# Patient Record
Sex: FEMALE | Race: ASIAN | NOT HISPANIC OR LATINO | Employment: FULL TIME | URBAN - METROPOLITAN AREA
[De-identification: names, ages, dates, MRNs, and addresses within clinical notes are randomized per-mention and may not be internally consistent; named-entity substitution may affect disease eponyms.]

---

## 2022-03-17 ENCOUNTER — APPOINTMENT (EMERGENCY)
Dept: RADIOLOGY | Facility: HOSPITAL | Age: 27
End: 2022-03-17
Payer: OTHER MISCELLANEOUS

## 2022-03-17 ENCOUNTER — HOSPITAL ENCOUNTER (EMERGENCY)
Facility: HOSPITAL | Age: 27
Discharge: HOME | End: 2022-03-17
Attending: EMERGENCY MEDICINE | Admitting: EMERGENCY MEDICINE
Payer: OTHER MISCELLANEOUS

## 2022-03-17 VITALS
DIASTOLIC BLOOD PRESSURE: 86 MMHG | HEART RATE: 96 BPM | OXYGEN SATURATION: 97 % | RESPIRATION RATE: 16 BRPM | TEMPERATURE: 98.9 F | SYSTOLIC BLOOD PRESSURE: 134 MMHG

## 2022-03-17 DIAGNOSIS — S61.419A HAND LACERATION: Primary | ICD-10-CM

## 2022-03-17 PROCEDURE — 99282 EMERGENCY DEPT VISIT SF MDM: CPT | Performed by: EMERGENCY MEDICINE

## 2022-03-17 PROCEDURE — 12001 RPR S/N/AX/GEN/TRNK 2.5CM/<: CPT | Performed by: EMERGENCY MEDICINE

## 2022-03-17 PROCEDURE — 73130 X-RAY EXAM OF HAND: CPT

## 2022-03-17 PROCEDURE — 99283 EMERGENCY DEPT VISIT LOW MDM: CPT

## 2022-03-17 PROCEDURE — 90715 TDAP VACCINE 7 YRS/> IM: CPT | Performed by: EMERGENCY MEDICINE

## 2022-03-17 PROCEDURE — 90471 IMMUNIZATION ADMIN: CPT

## 2022-03-17 RX ORDER — GINSENG 100 MG
1 CAPSULE ORAL ONCE
Status: DISCONTINUED | OUTPATIENT
Start: 2022-03-17 | End: 2022-03-17 | Stop reason: HOSPADM

## 2022-03-17 RX ORDER — LIDOCAINE HYDROCHLORIDE AND EPINEPHRINE 10; 10 MG/ML; UG/ML
20 INJECTION, SOLUTION INFILTRATION; PERINEURAL ONCE
Status: COMPLETED | OUTPATIENT
Start: 2022-03-17 | End: 2022-03-17

## 2022-03-17 RX ADMIN — TETANUS TOXOID, REDUCED DIPHTHERIA TOXOID AND ACELLULAR PERTUSSIS VACCINE, ADSORBED 0.5 ML: 5; 2.5; 8; 8; 2.5 SUSPENSION INTRAMUSCULAR at 16:45

## 2022-03-17 RX ADMIN — LIDOCAINE HYDROCHLORIDE,EPINEPHRINE BITARTRATE 20 ML: 10; .01 INJECTION, SOLUTION INFILTRATION; PERINEURAL at 16:47

## 2022-03-17 NOTE — ED PROVIDER NOTES
History  Chief Complaint   Patient presents with    Hand Injury     lac to left thumb and right pinky (bottom) Pt is a  and she was screwing a top back onto a glass container when it broke and it sliced her finger  Bleeding controlled with pressure but continues when pressure is removed  HPI  30-year-old female presents with laceration to left thumb and right hand  Patient was trying to manipulate a glass container when it broke and cut her hands  No weakness or numbness  Tetanus not up-to-date  None       History reviewed  No pertinent past medical history  History reviewed  No pertinent surgical history  History reviewed  No pertinent family history  I have reviewed and agree with the history as documented  E-Cigarette/Vaping     E-Cigarette/Vaping Substances     Social History     Tobacco Use    Smoking status: Not on file    Smokeless tobacco: Not on file   Substance Use Topics    Alcohol use: Not on file    Drug use: Not on file       Review of Systems   Constitutional: Negative for chills and fever  HENT: Negative for dental problem and ear pain  Eyes: Negative for pain and redness  Respiratory: Negative for cough and shortness of breath  Cardiovascular: Negative for chest pain and palpitations  Gastrointestinal: Negative for abdominal pain and nausea  Endocrine: Negative for polydipsia and polyphagia  Genitourinary: Negative for dysuria and frequency  Musculoskeletal: Negative for arthralgias and joint swelling  Skin: Positive for wound  Negative for color change and rash  Neurological: Negative for dizziness and headaches  Psychiatric/Behavioral: Negative for behavioral problems and confusion  All other systems reviewed and are negative  Physical Exam  Physical Exam  Vitals and nursing note reviewed  Constitutional:       General: She is not in acute distress  HENT:      Head: Normocephalic and atraumatic        Right Ear: External ear normal  Left Ear: External ear normal       Nose: Nose normal    Eyes:      General: No scleral icterus  Cardiovascular:      Rate and Rhythm: Normal rate  Pulmonary:      Effort: Pulmonary effort is normal  No respiratory distress  Abdominal:      General: There is no distension  Musculoskeletal:         General: No deformity  Normal range of motion  Comments: L thumb with 1 cm laceration to dorsal aspect, normal ROM, radial pulse 2+  R hand with   5 cm laceration to base of fifth digit   Skin:     Findings: No rash  Neurological:      General: No focal deficit present  Mental Status: She is alert        Gait: Gait normal    Psychiatric:         Mood and Affect: Mood normal          Vital Signs  ED Triage Vitals [03/17/22 1638]   Temperature Pulse Respirations Blood Pressure SpO2   98 9 °F (37 2 °C) 96 16 134/86 97 %      Temp Source Heart Rate Source Patient Position - Orthostatic VS BP Location FiO2 (%)   Oral Monitor Sitting Right arm --      Pain Score       --           Vitals:    03/17/22 1638   BP: 134/86   Pulse: 96   Patient Position - Orthostatic VS: Sitting         Visual Acuity      ED Medications  Medications   bacitracin topical ointment 1 small application (has no administration in time range)   tetanus-diphtheria-acellular pertussis (BOOSTRIX) IM injection 0 5 mL (0 5 mL Intramuscular Given 3/17/22 1645)   lidocaine-epinephrine (XYLOCAINE/EPINEPHRINE) 1 %-1:100,000 injection 20 mL (20 mL Infiltration Given 3/17/22 1647)       Diagnostic Studies  Results Reviewed     None                 XR hand 3+ views LEFT    (Results Pending)   XR hand 3+ views RIGHT    (Results Pending)              Procedures  Laceration repair    Date/Time: 3/17/2022 5:45 PM  Performed by: Alfonzo Ledesma MD  Authorized by: Alfonzo Ledesma MD   Consent given by: patient  Patient identity confirmed: verbally with patient and arm band  Body area: upper extremity  Location details: left thumb  Laceration length: 1 cm  Foreign bodies: no foreign bodies  Anesthesia: local infiltration    Anesthesia:  Local Anesthetic: lidocaine 1% with epinephrine      Procedure Details:  Irrigation solution: saline  Amount of cleaning: standard  Skin closure: 4-0 nylon  Number of sutures: 3  Technique: simple  Approximation: close  Approximation difficulty: simple  Dressing: antibiotic ointment and gauze roll               ED Course                               SBIRT 22yo+      Most Recent Value   SBIRT (24 yo +)    In order to provide better care to our patients, we are screening all of our patients for alcohol and drug use  Would it be okay to ask you these screening questions? Yes Filed at: 03/17/2022 1639   Initial Alcohol Screen: US AUDIT-C     1  How often do you have a drink containing alcohol? 0 Filed at: 03/17/2022 1639   2  How many drinks containing alcohol do you have on a typical day you are drinking? 0 Filed at: 03/17/2022 1639   3a  Male UNDER 65: How often do you have five or more drinks on one occasion? 0 Filed at: 03/17/2022 1639   3b  FEMALE Any Age, or MALE 65+: How often do you have 4 or more drinks on one occassion? 0 Filed at: 03/17/2022 1639   Audit-C Score 0 Filed at: 03/17/2022 1639   ALAN: How many times in the past year have you    Used an illegal drug or used a prescription medication for non-medical reasons? Never Filed at: 03/17/2022 1639                    [facility]  Thumb laceration repaired, no FB seen  Abrasion to R hand has been glued prior to arrival, no bleeding  Disposition  Final diagnoses:   Hand laceration     Time reflects when diagnosis was documented in both MDM as applicable and the Disposition within this note     Time User Action Codes Description Comment    3/17/2022  5:34 PM Aileen Jacobson Add [D60 [id number]] Hand laceration       ED Disposition     ED Disposition Condition Date/Time Comment    Discharge Stable Thu Mar 17, 2022  5:35 PM Rico Leak discharge to home/self care              Follow-up Information     Follow up With Specialties Details Why Contact Info Additional Information    [address] [facility] [address] Emergency Department Emergency Medicine In 14 days For suture removal [address] Emergency Department, [address] [place] [address], [facility]          Patient's Medications    No medications on file       No discharge procedures on file      PDMP Review     None          ED Provider  Electronically Signed by           Christal Felder MD  03/17/22 3853

## 2022-03-17 NOTE — ED NOTES
Pt is discharged to home at this time  VSS  AVS given and pt expressed understanding         Lai Heller, RAFY  03/17/22 5769

## 2022-04-12 ENCOUNTER — TELEPHONE (OUTPATIENT)
Dept: OBGYN CLINIC | Facility: OTHER | Age: 27
End: 2022-04-12

## 2022-04-12 NOTE — TELEPHONE ENCOUNTER
Mary Khan sent this email to [facility]!!    I have a patient that had a Hand Injury at work and is requesting to come in and see Dr Aubrie Reyes      Patient is :  Lisset Mcnally   : [birth year]-  MRN : [id number]  C/b # [phone]  [employer]   Reason for Appointment : Tendon Injury to Left Thumb  Requested Doctor & Location : Dr Aubrie Reyes @ [facility]     Thank you    Adrian Deleon

## 2022-05-04 ENCOUNTER — OFFICE VISIT (OUTPATIENT)
Dept: OBGYN CLINIC | Facility: CLINIC | Age: 27
End: 2022-05-04
Payer: OTHER MISCELLANEOUS

## 2022-05-04 VITALS
RESPIRATION RATE: 16 BRPM | WEIGHT: 214 LBS | HEIGHT: 65 IN | SYSTOLIC BLOOD PRESSURE: 119 MMHG | DIASTOLIC BLOOD PRESSURE: 76 MMHG | HEART RATE: 73 BPM | BODY MASS INDEX: 35.65 KG/M2

## 2022-05-04 DIAGNOSIS — S61.012A THUMB LACERATION, LEFT, INITIAL ENCOUNTER: Primary | ICD-10-CM

## 2022-05-04 PROCEDURE — 99204 OFFICE O/P NEW MOD 45 MIN: CPT | Performed by: SURGERY

## 2022-05-04 NOTE — PROGRESS NOTES
Seda HA  Attending, Orthopaedic Surgery  Hand, Wrist, and Elbow Surgery  [facility]      ORTHOPAEDIC HAND, WRIST, AND ELBOW OFFICE  VISIT       ASSESSMENT/PLAN:    26 y/o female with left thumb laceration, likely a partial high grade laceration of the EPL tendon  Patient I discussed with her injury 2 months ago she likely sustained at least a partial laceration her EPL tendon  We did discuss that this could be a full tendon laceration as well that has now formed scar tissue  Number to better evaluate the tendon I have ordered a stat ultrasound  I will also have them assess the integrity of the EIP as this may be needed for an EIP to EPL transfer should she decide to proceed with surgery  We did discuss that surgery would be indicated in order to get her better function thumb  Patient was encouraged to continue to move her thumb  I will see her back after stat ultrasound further possible surgical intervention  The patient verbalized understanding of exam findings and treatment plan  We engaged in the shared decision-making process and treatment options were discussed at length with the patient  Surgical and conservative management discussed today along with risks and benefits  Diagnoses and all orders for this visit:    Thumb laceration, left, initial encounter  -     US MSK limited; Future        Follow Up:  Return for after ultrasound  To Do Next Visit:    review ultrasound, surgical discussion        ____________________________________________________________________________________________________________________________________________      CHIEF COMPLAINT:  Chief Complaint   Patient presents with    Left Thumb - Pain, Numbness       SUBJECTIVE:  Rick Morgan is a 27y o  year old RHD female who presents to the office today for evaluation of her left thumb  This is a worker's compensation case, patient works as a     Patient states in March she was holding a nail bottle which broke  Patient sustained a laceration to the dorsal aspect of her left thumb  She was seen in the ED where sutures were placed  She did have these removed subsequently in the ED as well  Patient states since time laceration she has had trouble fully extending her thumb in comparison to contralateral side  She notes lack of control of her thumb as well  She denies any numbness or tingling  She has not had any treatment for this  Prior treatment   · NSAIDsNo   · Injections No   · Bracing/Orthotics No    Physical Therapy No     I have personally reviewed all the relevant PMH, PSH, SH, FH, Medications and allergies      PAST MEDICAL HISTORY:  Past Medical History:   Diagnosis Date    Known health problems: none        PAST SURGICAL HISTORY:  Past Surgical History:   Procedure Laterality Date    NO PAST SURGERIES         FAMILY HISTORY:  Family History   Problem Relation Age of Onset    No Known Problems Mother     No Known Problems Father        SOCIAL HISTORY:  Social History     Tobacco Use    Smoking status: Current Every Day Smoker     Packs/day: 0 25     Types: Cigarettes    Smokeless tobacco: Never Used    Tobacco comment: 1-2 cig a day   Vaping Use    Vaping Use: Never used   Substance Use Topics    Alcohol use: Not Currently    Drug use: Never       MEDICATIONS:  No current outpatient medications on file  ALLERGIES:  No Known Allergies        REVIEW OF SYSTEMS:  Review of Systems   Constitutional: Negative for chills, fever and unexpected weight change  HENT: Negative for hearing loss, nosebleeds and sore throat  Eyes: Negative for pain, redness and visual disturbance  Respiratory: Negative for cough, shortness of breath and wheezing  Cardiovascular: Negative for chest pain, palpitations and leg swelling  Gastrointestinal: Negative for abdominal pain, nausea and vomiting  Endocrine: Negative for polydipsia and polyuria     Genitourinary: Negative for dyspareunia and hematuria  Musculoskeletal: Negative for arthralgias, joint swelling and myalgias  Skin: Negative for rash and wound  Neurological: Negative for dizziness, numbness and headaches  Psychiatric/Behavioral: Negative for decreased concentration and suicidal ideas  The patient is not nervous/anxious  VITALS:  Vitals:    05/04/22 0944   BP: 119/76   Pulse: 73   Resp: 16       LABS:  HgA1c: No results found for: HGBA1C  BMP: No results found for: GLUCOSE, CALCIUM, NA, K, CO2, CL, BUN, CREATININE    _____________________________________________________  PHYSICAL EXAMINATION:  General: well developed and well nourished, alert, oriented times 3 and appears comfortable  Psychiatric: Normal  HEENT: Normocephalic, Atraumatic Trachea Midline, No torticollis  Pulmonary: No audible wheezing or respiratory distress   Abdomen/GI: Non tender, non distended   Cardiovascular: No pitting edema, 2+ radial pulse   Skin: No masses, erythema, fluctation, ulcerations  Neurovascular: Sensation Intact to the Median, Ulnar, Radial Nerve, Motor Intact to the Median, Ulnar, Radial Nerve and Pulses Intact  Musculoskeletal: Normal, except as noted in detailed exam and in HPI        MUSCULOSKELETAL EXAMINATION:  Left thumb: Laceration well-healed to the dorsal aspect of the thumb in line with the MCP joint, no erythema or ecchymosis noted Feliz Anger no swelling noted, unable to fully retropulsed thumb in comparison to contralateral side, FPL, sensation intact to the radial and ulnar aspect of the digit, brisk capillary refill noted    ___________________________________________________  STUDIES REVIEWED:  No new imaging today    PROCEDURES PERFORMED:  Procedures  No Procedures performed today    _____________________________________________________      Jimbo Finders    I,:  Aj Janus am acting as a scribe while in the presence of the attending physician :       I,:  Ashly [place] MD personally performed the services described in this documentation    as scribed in my presence :

## 2022-05-09 ENCOUNTER — TELEPHONE (OUTPATIENT)
Dept: OBGYN CLINIC | Facility: CLINIC | Age: 27
End: 2022-05-09

## 2022-05-12 ENCOUNTER — OFFICE VISIT (OUTPATIENT)
Dept: OBGYN CLINIC | Facility: CLINIC | Age: 27
End: 2022-05-12
Payer: OTHER MISCELLANEOUS

## 2022-05-12 VITALS
HEIGHT: 65 IN | DIASTOLIC BLOOD PRESSURE: 74 MMHG | RESPIRATION RATE: 16 BRPM | HEART RATE: 72 BPM | SYSTOLIC BLOOD PRESSURE: 122 MMHG | BODY MASS INDEX: 35.65 KG/M2 | WEIGHT: 214 LBS

## 2022-05-12 DIAGNOSIS — S61.012D THUMB LACERATION, LEFT, SUBSEQUENT ENCOUNTER: Primary | ICD-10-CM

## 2022-05-12 PROCEDURE — 99215 OFFICE O/P EST HI 40 MIN: CPT | Performed by: SURGERY

## 2022-05-12 RX ORDER — CHLORHEXIDINE GLUCONATE 0.12 MG/ML
15 RINSE ORAL ONCE
Status: CANCELLED | OUTPATIENT
Start: 2022-05-12 | End: 2022-05-12

## 2022-05-12 NOTE — H&P (VIEW-ONLY)
ASSESSMENT/PLAN:      27 y o  female with left thumb laceration, likely a partial high grade laceration of the EPL tendon  Ultrasound report was reviewed in the office today  It was discussed with Ailyn Cade that the ultrasound was vague and not significantly beneficial  She likely sustained a partial high grade laceration of her EPL tendon, which has likely scared in  Left thumb EPL exploration/repair vs EIp to EPL transfer was discussed at length including risks and benefits  Risks of surgery consist of but not limited to bleeding, infection, stiffness, pain, injury to surrounding structures, failure/ruture of repair, need for further surgery, etc  Ailyn Cade elected to proceed and informed surgical consent was signed  Post operative instructions/expectations were reviewed  She will likely be immobilization for aprox  6 weeks depending on what is found in the OR and which procedure is performed  She will start OT post operatively  Follow up in the office 10-14 days after surgery for suture removal      The patient verbalized understanding of exam findings and treatment plan  We engaged in the shared decision-making process and treatment options were discussed at length with the patient  Surgical and conservative management discussed today along with risks and benefits  Diagnoses and all orders for this visit:    Thumb laceration, left, subsequent encounter      Extensor Tendon Repair: The patient has elected to undergo operative repair of a lacerated extensor tendon  During surgery, an incision will be made on the dorsal aspect of the hand or wrist which will be extended proximally to the level of tendon retraction  The tendon will be delivered distally, and sutured to the remaining portion of the tendon  Postoperatively, the splint will be applied in a protected position  Postoperative physical therapy is a necessity to help improve outcomes    The risks of the surgery include tendon rupture, stiffness, pain, and incomplete motion  The risks and benefits of the procedure were explained to the patient, which include, but are not limited to: Bleeding, infection, recurrence, pain, scar, damage to tendons, damage to nerves, and damage to blood vessels, failure to give desired results and complications related to anesthesia  These risks, along with alternative conservative treatment options, and postoperative protocols were voiced back and understood by the patient  All questions were answered to the patient's satisfaction  The patient agrees to comply with a standard postoperative protocol, and is willing to proceed  Education was provided via written and auditory forms  There were no barriers to learning  Written handouts regarding wound care, incision and scar care, and general preoperative information was provided to the patient  Prior to surgery, the patient may be requested to stop all anti-inflammatory medications  Prophylactic aspirin, Plavix, and Coumadin may be allowed to be continued  Medications including vitamin E , ginkgo, and fish oil are requested to be stopped approximately one week prior to surgery  Hypertensive medications and beta blockers, if taken, should be continued  and Standard Consent: The risks and benefits of the procedure were explained to the patient, which include, but are not limited to: Bleeding, infection, recurrence, pain, scar, damage to tendons, damage to nerves, and damage to blood vessels, failure to give desired results and complications related to anesthesia  These risks, along with alternative conservative treatment options, and postoperative protocols were voiced back and understood by the patient  All questions were answered to the patient's satisfaction  The patient agrees to comply with a standard postoperative protocol, and is willing to proceed  Education was provided via written and auditory forms  There were no barriers to learning   Written handouts regarding wound care, incision and scar care, and general preoperative information was provided to the patient  Prior to surgery, the patient may be requested to stop all anti-inflammatory medications  Prophylactic aspirin, Plavix, and Coumadin may be allowed to be continued  Medications including vitamin E , ginkgo, and fish oil are requested to be stopped approximately one week prior to surgery  Hypertensive medications and beta blockers, if taken, should be continued  Follow Up:  No follow-ups on file  To Do Next Visit:  Re-evaluation of current issue and Sutures out    ____________________________________________________________________________________________________________________________________________      CHIEF COMPLAINT:  Chief Complaint   Patient presents with    Left Thumb - Pain, Follow-up       SUBJECTIVE:  Lynn Ryan is a 27y o  year old RHD female who presents to the office today for a follow up regarding a left thumb laceration  This is a worker's compensation case, patient works as a   Patient states in March she was holding a nail bottle which broke  Patient sustained a laceration to the dorsal aspect of her left thumb  She is here today to review ultrasound results  Ultrasound was performed at [address] [place] [address] imaging [place] in [place]  She notes extension at her IP joint is still limited  She notes tightness with finger flexion  I have personally reviewed all the relevant PMH, PSH, SH, FH, Medications and allergies       PAST MEDICAL HISTORY:  Past Medical History:   Diagnosis Date    Known health problems: none        PAST SURGICAL HISTORY:  Past Surgical History:   Procedure Laterality Date    NO PAST SURGERIES         FAMILY HISTORY:  Family History   Problem Relation Age of Onset    No Known Problems Mother     No Known Problems Father        SOCIAL HISTORY:  Social History     Tobacco Use    Smoking status: Current Every Day Smoker Packs/day: 0 25     Types: Cigarettes    Smokeless tobacco: Never Used    Tobacco comment: 1-2 cig a day   Vaping Use    Vaping Use: Never used   Substance Use Topics    Alcohol use: Not Currently    Drug use: Never       MEDICATIONS:  No current outpatient medications on file  ALLERGIES:  No Known Allergies    REVIEW OF SYSTEMS:  Review of Systems   Constitutional: Negative for chills, fever and unexpected weight change  HENT: Negative for hearing loss, nosebleeds and sore throat  Eyes: Negative for pain, redness and visual disturbance  Respiratory: Negative for cough, shortness of breath and wheezing  Cardiovascular: Negative for chest pain, palpitations and leg swelling  Gastrointestinal: Negative for abdominal pain, nausea and vomiting  Endocrine: Negative for polydipsia and polyuria  Genitourinary: Negative for difficulty urinating and hematuria  Musculoskeletal: Negative for arthralgias, joint swelling and myalgias  Skin: Negative for rash and wound  Neurological: Negative for dizziness, numbness and headaches  Psychiatric/Behavioral: Negative for decreased concentration, dysphoric mood and suicidal ideas  The patient is not nervous/anxious          VITALS:  Vitals:    05/12/22 1018   BP: 122/74   Pulse: 72   Resp: 16       LABS:  HgA1c: No results found for: HGBA1C  BMP: No results found for: GLUCOSE, CALCIUM, NA, K, CO2, CL, BUN, CREATININE    _____________________________________________________  PHYSICAL EXAMINATION:  General: well developed and well nourished, alert, oriented times 3 and appears comfortable  Psychiatric: Normal  HEENT: Normocephalic, Atraumatic Trachea Midline, No torticollis  Pulmonary: No audible wheezing or respiratory distress   Cardiovascular: No pitting edema, 2+ radial pulse   Abdominal/GI: abdomen non tender, non distended   Skin: No Masses, No Erythema, No Fluctuation, No Ulcerations  Neurovascular: Sensation Intact to the Median, Ulnar, Radial Nerve, Motor Intact to the Median, Ulnar, Radial Nerve and Pulses Intact  Musculoskeletal: Normal, except as noted in detailed exam and in HPI        MUSCULOSKELETAL EXAMINATION:    Left thumb:     No erythema, ecchymosis or edema  Well healed laceration to the dorsal aspect in line with MP joint  Unable to fully retropulse thumb in comparison to contralateral side, FPL   Sensation intact to ulnar and radial aspect of the thumb   Full composite fist   Brisk capillary refill      ___________________________________________________  STUDIES REVIEWED:  I am able to see the images of the ultrasound but report demonstrates a fluid gap in the extensor tendon measuring approximately 1 centimeter      PROCEDURES PERFORMED:  Procedures  No Procedures performed today    _____________________________________________________      Khanh Daley    I,:  Jaylan Parker am acting as a scribe while in the presence of the attending physician :       I,:  Carmen Martinez MD personally performed the services described in this documentation    as scribed in my presence :

## 2022-05-12 NOTE — H&P
ASSESSMENT/PLAN:      27 y o  female with left thumb laceration, likely a partial high grade laceration of the EPL tendon  Ultrasound report was reviewed in the office today  It was discussed with Jh Quezada that the ultrasound was vague and not significantly beneficial  She likely sustained a partial high grade laceration of her EPL tendon, which has likely scared in  Left thumb EPL exploration/repair vs EIp to EPL transfer was discussed at length including risks and benefits  Risks of surgery consist of but not limited to bleeding, infection, stiffness, pain, injury to surrounding structures, failure/ruture of repair, need for further surgery, etc  Jh Quezada elected to proceed and informed surgical consent was signed  Post operative instructions/expectations were reviewed  She will likely be immobilization for aprox  6 weeks depending on what is found in the OR and which procedure is performed  She will start OT post operatively  Follow up in the office 10-14 days after surgery for suture removal      The patient verbalized understanding of exam findings and treatment plan  We engaged in the shared decision-making process and treatment options were discussed at length with the patient  Surgical and conservative management discussed today along with risks and benefits  Diagnoses and all orders for this visit:    Thumb laceration, left, subsequent encounter      Extensor Tendon Repair: The patient has elected to undergo operative repair of a lacerated extensor tendon  During surgery, an incision will be made on the dorsal aspect of the hand or wrist which will be extended proximally to the level of tendon retraction  The tendon will be delivered distally, and sutured to the remaining portion of the tendon  Postoperatively, the splint will be applied in a protected position  Postoperative physical therapy is a necessity to help improve outcomes    The risks of the surgery include tendon rupture, stiffness, pain, and incomplete motion  The risks and benefits of the procedure were explained to the patient, which include, but are not limited to: Bleeding, infection, recurrence, pain, scar, damage to tendons, damage to nerves, and damage to blood vessels, failure to give desired results and complications related to anesthesia  These risks, along with alternative conservative treatment options, and postoperative protocols were voiced back and understood by the patient  All questions were answered to the patient's satisfaction  The patient agrees to comply with a standard postoperative protocol, and is willing to proceed  Education was provided via written and auditory forms  There were no barriers to learning  Written handouts regarding wound care, incision and scar care, and general preoperative information was provided to the patient  Prior to surgery, the patient may be requested to stop all anti-inflammatory medications  Prophylactic aspirin, Plavix, and Coumadin may be allowed to be continued  Medications including vitamin E , ginkgo, and fish oil are requested to be stopped approximately one week prior to surgery  Hypertensive medications and beta blockers, if taken, should be continued  and Standard Consent: The risks and benefits of the procedure were explained to the patient, which include, but are not limited to: Bleeding, infection, recurrence, pain, scar, damage to tendons, damage to nerves, and damage to blood vessels, failure to give desired results and complications related to anesthesia  These risks, along with alternative conservative treatment options, and postoperative protocols were voiced back and understood by the patient  All questions were answered to the patient's satisfaction  The patient agrees to comply with a standard postoperative protocol, and is willing to proceed  Education was provided via written and auditory forms  There were no barriers to learning   Written handouts regarding wound care, incision and scar care, and general preoperative information was provided to the patient  Prior to surgery, the patient may be requested to stop all anti-inflammatory medications  Prophylactic aspirin, Plavix, and Coumadin may be allowed to be continued  Medications including vitamin E , ginkgo, and fish oil are requested to be stopped approximately one week prior to surgery  Hypertensive medications and beta blockers, if taken, should be continued  Follow Up:  No follow-ups on file  To Do Next Visit:  Re-evaluation of current issue and Sutures out    ____________________________________________________________________________________________________________________________________________      CHIEF COMPLAINT:  Chief Complaint   Patient presents with    Left Thumb - Pain, Follow-up       SUBJECTIVE:  Olga Lidia Stuart is a 27y o  year old RHD female who presents to the office today for a follow up regarding a left thumb laceration  This is a worker's compensation case, patient works as a   Patient states in March she was holding a nail bottle which broke  Patient sustained a laceration to the dorsal aspect of her left thumb  She is here today to review ultrasound results  Ultrasound was performed at [address] [place] [address] imaging [place] in [place]  She notes extension at her IP joint is still limited  She notes tightness with finger flexion  I have personally reviewed all the relevant PMH, PSH, SH, FH, Medications and allergies       PAST MEDICAL HISTORY:  Past Medical History:   Diagnosis Date    Known health problems: none        PAST SURGICAL HISTORY:  Past Surgical History:   Procedure Laterality Date    NO PAST SURGERIES         FAMILY HISTORY:  Family History   Problem Relation Age of Onset    No Known Problems Mother     No Known Problems Father        SOCIAL HISTORY:  Social History     Tobacco Use    Smoking status: Current Every Day Smoker Packs/day: 0 25     Types: Cigarettes    Smokeless tobacco: Never Used    Tobacco comment: 1-2 cig a day   Vaping Use    Vaping Use: Never used   Substance Use Topics    Alcohol use: Not Currently    Drug use: Never       MEDICATIONS:  No current outpatient medications on file  ALLERGIES:  No Known Allergies    REVIEW OF SYSTEMS:  Review of Systems   Constitutional: Negative for chills, fever and unexpected weight change  HENT: Negative for hearing loss, nosebleeds and sore throat  Eyes: Negative for pain, redness and visual disturbance  Respiratory: Negative for cough, shortness of breath and wheezing  Cardiovascular: Negative for chest pain, palpitations and leg swelling  Gastrointestinal: Negative for abdominal pain, nausea and vomiting  Endocrine: Negative for polydipsia and polyuria  Genitourinary: Negative for difficulty urinating and hematuria  Musculoskeletal: Negative for arthralgias, joint swelling and myalgias  Skin: Negative for rash and wound  Neurological: Negative for dizziness, numbness and headaches  Psychiatric/Behavioral: Negative for decreased concentration, dysphoric mood and suicidal ideas  The patient is not nervous/anxious          VITALS:  Vitals:    05/12/22 1018   BP: 122/74   Pulse: 72   Resp: 16       LABS:  HgA1c: No results found for: HGBA1C  BMP: No results found for: GLUCOSE, CALCIUM, NA, K, CO2, CL, BUN, CREATININE    _____________________________________________________  PHYSICAL EXAMINATION:  General: well developed and well nourished, alert, oriented times 3 and appears comfortable  Psychiatric: Normal  HEENT: Normocephalic, Atraumatic Trachea Midline, No torticollis  Pulmonary: No audible wheezing or respiratory distress   Cardiovascular: No pitting edema, 2+ radial pulse   Abdominal/GI: abdomen non tender, non distended   Skin: No Masses, No Erythema, No Fluctuation, No Ulcerations  Neurovascular: Sensation Intact to the Median, Ulnar, Radial Nerve, Motor Intact to the Median, Ulnar, Radial Nerve and Pulses Intact  Musculoskeletal: Normal, except as noted in detailed exam and in HPI        MUSCULOSKELETAL EXAMINATION:    Left thumb:     No erythema, ecchymosis or edema  Well healed laceration to the dorsal aspect in line with MP joint  Unable to fully retropulse thumb in comparison to contralateral side, FPL   Sensation intact to ulnar and radial aspect of the thumb   Full composite fist   Brisk capillary refill      ___________________________________________________  STUDIES REVIEWED:  I am able to see the images of the ultrasound but report demonstrates a fluid gap in the extensor tendon measuring approximately 1 centimeter      PROCEDURES PERFORMED:  Procedures  No Procedures performed today    _____________________________________________________      Karla Peguero    I,:  Ru Arroyo Ruthannpaul am acting as a scribe while in the presence of the attending physician :       I,:  Rinku Solomon MD personally performed the services described in this documentation    as scribed in my presence :

## 2022-05-12 NOTE — PROGRESS NOTES
ASSESSMENT/PLAN:      27 y o  female with left thumb laceration, likely a partial high grade laceration of the EPL tendon  Ultrasound report was reviewed in the office today  It was discussed with Adalgisa Yanes that the ultrasound was vague and not significantly beneficial  She likely sustained a partial high grade laceration of her EPL tendon, which has likely scared in  Left thumb EPL exploration/repair vs EIp to EPL transfer was discussed at length including risks and benefits  Risks of surgery consist of but not limited to bleeding, infection, stiffness, pain, injury to surrounding structures, failure/ruture of repair, need for further surgery, etc  Adalgisa Yanes elected to proceed and informed surgical consent was signed  Post operative instructions/expectations were reviewed  She will likely be immobilization for aprox  6 weeks depending on what is found in the OR and which procedure is performed  She will start OT post operatively  Follow up in the office 10-14 days after surgery for suture removal      The patient verbalized understanding of exam findings and treatment plan  We engaged in the shared decision-making process and treatment options were discussed at length with the patient  Surgical and conservative management discussed today along with risks and benefits  Diagnoses and all orders for this visit:    Thumb laceration, left, subsequent encounter      Extensor Tendon Repair: The patient has elected to undergo operative repair of a lacerated extensor tendon  During surgery, an incision will be made on the dorsal aspect of the hand or wrist which will be extended proximally to the level of tendon retraction  The tendon will be delivered distally, and sutured to the remaining portion of the tendon  Postoperatively, the splint will be applied in a protected position  Postoperative physical therapy is a necessity to help improve outcomes    The risks of the surgery include tendon rupture, stiffness, pain, and incomplete motion  The risks and benefits of the procedure were explained to the patient, which include, but are not limited to: Bleeding, infection, recurrence, pain, scar, damage to tendons, damage to nerves, and damage to blood vessels, failure to give desired results and complications related to anesthesia  These risks, along with alternative conservative treatment options, and postoperative protocols were voiced back and understood by the patient  All questions were answered to the patient's satisfaction  The patient agrees to comply with a standard postoperative protocol, and is willing to proceed  Education was provided via written and auditory forms  There were no barriers to learning  Written handouts regarding wound care, incision and scar care, and general preoperative information was provided to the patient  Prior to surgery, the patient may be requested to stop all anti-inflammatory medications  Prophylactic aspirin, Plavix, and Coumadin may be allowed to be continued  Medications including vitamin E , ginkgo, and fish oil are requested to be stopped approximately one week prior to surgery  Hypertensive medications and beta blockers, if taken, should be continued  and Standard Consent: The risks and benefits of the procedure were explained to the patient, which include, but are not limited to: Bleeding, infection, recurrence, pain, scar, damage to tendons, damage to nerves, and damage to blood vessels, failure to give desired results and complications related to anesthesia  These risks, along with alternative conservative treatment options, and postoperative protocols were voiced back and understood by the patient  All questions were answered to the patient's satisfaction  The patient agrees to comply with a standard postoperative protocol, and is willing to proceed  Education was provided via written and auditory forms  There were no barriers to learning   Written handouts regarding wound care, incision and scar care, and general preoperative information was provided to the patient  Prior to surgery, the patient may be requested to stop all anti-inflammatory medications  Prophylactic aspirin, Plavix, and Coumadin may be allowed to be continued  Medications including vitamin E , ginkgo, and fish oil are requested to be stopped approximately one week prior to surgery  Hypertensive medications and beta blockers, if taken, should be continued  Follow Up:  No follow-ups on file  To Do Next Visit:  Re-evaluation of current issue and Sutures out    ____________________________________________________________________________________________________________________________________________      CHIEF COMPLAINT:  Chief Complaint   Patient presents with    Left Thumb - Pain, Follow-up       SUBJECTIVE:  Ana Lilia Cash is a 27y o  year old RHD female who presents to the office today for a follow up regarding a left thumb laceration  This is a worker's compensation case, patient works as a   Patient states in March she was holding a nail bottle which broke  Patient sustained a laceration to the dorsal aspect of her left thumb  She is here today to review ultrasound results  Ultrasound was performed at [address] [place] [address] imaging [place] in [place]  She notes extension at her IP joint is still limited  She notes tightness with finger flexion  I have personally reviewed all the relevant PMH, PSH, SH, FH, Medications and allergies       PAST MEDICAL HISTORY:  Past Medical History:   Diagnosis Date    Known health problems: none        PAST SURGICAL HISTORY:  Past Surgical History:   Procedure Laterality Date    NO PAST SURGERIES         FAMILY HISTORY:  Family History   Problem Relation Age of Onset    No Known Problems Mother     No Known Problems Father        SOCIAL HISTORY:  Social History     Tobacco Use    Smoking status: Current Every Day Smoker Packs/day: 0 25     Types: Cigarettes    Smokeless tobacco: Never Used    Tobacco comment: 1-2 cig a day   Vaping Use    Vaping Use: Never used   Substance Use Topics    Alcohol use: Not Currently    Drug use: Never       MEDICATIONS:  No current outpatient medications on file  ALLERGIES:  No Known Allergies    REVIEW OF SYSTEMS:  Review of Systems   Constitutional: Negative for chills, fever and unexpected weight change  HENT: Negative for hearing loss, nosebleeds and sore throat  Eyes: Negative for pain, redness and visual disturbance  Respiratory: Negative for cough, shortness of breath and wheezing  Cardiovascular: Negative for chest pain, palpitations and leg swelling  Gastrointestinal: Negative for abdominal pain, nausea and vomiting  Endocrine: Negative for polydipsia and polyuria  Genitourinary: Negative for difficulty urinating and hematuria  Musculoskeletal: Negative for arthralgias, joint swelling and myalgias  Skin: Negative for rash and wound  Neurological: Negative for dizziness, numbness and headaches  Psychiatric/Behavioral: Negative for decreased concentration, dysphoric mood and suicidal ideas  The patient is not nervous/anxious          VITALS:  Vitals:    05/12/22 1018   BP: 122/74   Pulse: 72   Resp: 16       LABS:  HgA1c: No results found for: HGBA1C  BMP: No results found for: GLUCOSE, CALCIUM, NA, K, CO2, CL, BUN, CREATININE    _____________________________________________________  PHYSICAL EXAMINATION:  General: well developed and well nourished, alert, oriented times 3 and appears comfortable  Psychiatric: Normal  HEENT: Normocephalic, Atraumatic Trachea Midline, No torticollis  Pulmonary: No audible wheezing or respiratory distress   Cardiovascular: No pitting edema, 2+ radial pulse   Abdominal/GI: abdomen non tender, non distended   Skin: No Masses, No Erythema, No Fluctuation, No Ulcerations  Neurovascular: Sensation Intact to the Median, Ulnar, Radial Nerve, Motor Intact to the Median, Ulnar, Radial Nerve and Pulses Intact  Musculoskeletal: Normal, except as noted in detailed exam and in HPI        MUSCULOSKELETAL EXAMINATION:    Left thumb:     No erythema, ecchymosis or edema  Well healed laceration to the dorsal aspect in line with MP joint  Unable to fully retropulse thumb in comparison to contralateral side, FPL   Sensation intact to ulnar and radial aspect of the thumb   Full composite fist   Brisk capillary refill      ___________________________________________________  STUDIES REVIEWED:  I am able to see the images of the ultrasound but report demonstrates a fluid gap in the extensor tendon measuring approximately 1 centimeter      PROCEDURES PERFORMED:  Procedures  No Procedures performed today    _____________________________________________________      Estelle Estrada    I,:  Octaviano Parker am acting as a scribe while in the presence of the attending physician :       I,:  Noam Fernandes MD personally performed the services described in this documentation    as scribed in my presence :

## 2022-05-13 ENCOUNTER — ANESTHESIA EVENT (OUTPATIENT)
Dept: PERIOP | Facility: AMBULARY SURGERY CENTER | Age: 27
End: 2022-05-13
Payer: OTHER MISCELLANEOUS

## 2022-05-23 NOTE — PRE-PROCEDURE INSTRUCTIONS
No outpatient medications have been marked as taking for the 5/24/22 encounter [facility] HOSPITAL Encounter)  My Surgical Experience    The following information was developed to assist you to prepare for your operation  What do I need to do before coming to the hospital?   Arrange for a responsible person to drive you to and from the hospital    Arrange care for your children at home  Children are not allowed in the recovery areas of the hospital   Plan to wear clothing that is easy to put on and take off  If you are having shoulder surgery, wear a shirt that buttons or zippers in the front  Bathing  o Shower the evening before and the morning of your surgery with an antibacterial soap  Please refer to the Pre Op Showering Instructions for Surgery Patients Sheet   o Remove nail polish and all body piercing jewelry  o Do not shave any body part for at least 24 hours before surgery-this includes face, arms, legs and upper body  Food  o Nothing to eat or drink after midnight the night before your surgery  This includes candy and chewing gum  o Exception: If your surgery is after 12:00pm (noon), you may have clear liquids such as 7-Up®, ginger ale, apple or cranberry juice, Jell-O®, water, or clear broth until 8:00 am  o Do not drink milk or juice with pulp on the morning before surgery  o Do not drink alcohol 24 hours before surgery  Medicine  o Follow instructions you received from your surgeon about which medicines you may take on the day of surgery  o If instructed to take medicine on the morning of surgery, take pills with just a small sip of water  Call your prescribing doctor for specific infroamtion on what to do if you take insulin    What should I bring to the hospital?    Bring:  Mihir Yeh or a walker, if you have them, for foot or knee surgery   A list of the daily medicines, vitamins, minerals, herbals and nutritional supplements you take   Include the dosages of medicines and the time you take them each day   Glasses, dentures or hearing aids   Minimal clothing; you will be wearing hospital sleepwear   Photo ID; required to verify your identity   If you have a Living Will or Power of , bring a copy of the documents   If you have an ostomy, bring an extra pouch and any supplies you use    Do not bring   Medicines or inhalers   Money, valuables or jewelry    What other information should I know about the day of surgery?  Notify your surgeons if you develop a cold, sore throat, cough, fever, rash or any other illness   Report to the Ambulatory Surgical/Same Day Surgery Unit   You will be instructed to stop at Registration only if you have not been pre-registered   Inform your  fi they do not stay that they will be asked by the staff to leave a phone number where they can be reached   Be available to be reached before surgery  In the event the operating room schedule changes, you may be asked to come in earlier or later than expected    *It is important to tell your doctor and others involved in your health care if you are taking or have been taking any non-prescription drugs, vitamins, minerals, herbals or other nutritional supplements   Any of these may interact with some food or medicines and cause a reaction

## 2022-05-24 ENCOUNTER — APPOINTMENT (OUTPATIENT)
Dept: RADIOLOGY | Facility: AMBULARY SURGERY CENTER | Age: 27
End: 2022-05-24
Payer: OTHER MISCELLANEOUS

## 2022-05-24 ENCOUNTER — ANESTHESIA (OUTPATIENT)
Dept: PERIOP | Facility: AMBULARY SURGERY CENTER | Age: 27
End: 2022-05-24
Payer: OTHER MISCELLANEOUS

## 2022-05-24 ENCOUNTER — HOSPITAL ENCOUNTER (OUTPATIENT)
Facility: AMBULARY SURGERY CENTER | Age: 27
Setting detail: OUTPATIENT SURGERY
Discharge: HOME | End: 2022-05-24
Attending: SURGERY | Admitting: SURGERY
Payer: OTHER MISCELLANEOUS

## 2022-05-24 VITALS
OXYGEN SATURATION: 98 % | HEART RATE: 64 BPM | RESPIRATION RATE: 17 BRPM | SYSTOLIC BLOOD PRESSURE: 106 MMHG | BODY MASS INDEX: 35.32 KG/M2 | WEIGHT: 212 LBS | TEMPERATURE: 96.8 F | HEIGHT: 65 IN | DIASTOLIC BLOOD PRESSURE: 60 MMHG

## 2022-05-24 DIAGNOSIS — S61.012D THUMB LACERATION, LEFT, SUBSEQUENT ENCOUNTER: ICD-10-CM

## 2022-05-24 DIAGNOSIS — S61.209D EXTENSOR TENDON LACERATION OF FINGER WITH OPEN WOUND, SUBSEQUENT ENCOUNTER: Primary | ICD-10-CM

## 2022-05-24 DIAGNOSIS — S56.429D EXTENSOR TENDON LACERATION OF FINGER WITH OPEN WOUND, SUBSEQUENT ENCOUNTER: Primary | ICD-10-CM

## 2022-05-24 PROBLEM — S56.429A EXTENSOR TENDON LACERATION, FINGER, OPEN WOUND: Status: ACTIVE | Noted: 2022-05-24

## 2022-05-24 PROBLEM — S61.209A EXTENSOR TENDON LACERATION, FINGER, OPEN WOUND: Status: ACTIVE | Noted: 2022-05-24

## 2022-05-24 PROBLEM — E66.9 CLASS 2 OBESITY IN ADULT: Status: ACTIVE | Noted: 2022-05-24

## 2022-05-24 PROBLEM — F17.200 SMOKING: Status: ACTIVE | Noted: 2022-05-24

## 2022-05-24 LAB
EXT PREGNANCY TEST URINE: NEGATIVE
EXT. CONTROL: NORMAL

## 2022-05-24 PROCEDURE — 26418 REPAIR FINGER TENDON: CPT | Performed by: SURGERY

## 2022-05-24 PROCEDURE — 81025 URINE PREGNANCY TEST: CPT | Performed by: ANESTHESIOLOGY

## 2022-05-24 PROCEDURE — 26418 REPAIR FINGER TENDON: CPT | Performed by: PHYSICIAN ASSISTANT

## 2022-05-24 RX ORDER — KETAMINE HCL IN NACL, ISO-OSM 100MG/10ML
SYRINGE (ML) INJECTION AS NEEDED
Status: DISCONTINUED | OUTPATIENT
Start: 2022-05-24 | End: 2022-05-24

## 2022-05-24 RX ORDER — HYDROMORPHONE HCL/PF 1 MG/ML
0.5 SYRINGE (ML) INJECTION
Status: DISCONTINUED | OUTPATIENT
Start: 2022-05-24 | End: 2022-05-24 | Stop reason: HOSPADM

## 2022-05-24 RX ORDER — LIDOCAINE HYDROCHLORIDE 10 MG/ML
0.5 INJECTION, SOLUTION EPIDURAL; INFILTRATION; INTRACAUDAL; PERINEURAL ONCE AS NEEDED
Status: COMPLETED | OUTPATIENT
Start: 2022-05-24 | End: 2022-05-24

## 2022-05-24 RX ORDER — MAGNESIUM HYDROXIDE 1200 MG/15ML
LIQUID ORAL AS NEEDED
Status: DISCONTINUED | OUTPATIENT
Start: 2022-05-24 | End: 2022-05-24 | Stop reason: HOSPADM

## 2022-05-24 RX ORDER — EPHEDRINE SULFATE 50 MG/ML
INJECTION INTRAVENOUS AS NEEDED
Status: DISCONTINUED | OUTPATIENT
Start: 2022-05-24 | End: 2022-05-24

## 2022-05-24 RX ORDER — HYDROCODONE BITARTRATE AND ACETAMINOPHEN 5; 325 MG/1; MG/1
1 TABLET ORAL EVERY 6 HOURS PRN
Qty: 15 TABLET | Refills: 0 | Status: SHIPPED | OUTPATIENT
Start: 2022-05-24 | End: 2022-06-03

## 2022-05-24 RX ORDER — FENTANYL CITRATE 50 UG/ML
INJECTION, SOLUTION INTRAMUSCULAR; INTRAVENOUS AS NEEDED
Status: DISCONTINUED | OUTPATIENT
Start: 2022-05-24 | End: 2022-05-24

## 2022-05-24 RX ORDER — ONDANSETRON 2 MG/ML
INJECTION INTRAMUSCULAR; INTRAVENOUS AS NEEDED
Status: DISCONTINUED | OUTPATIENT
Start: 2022-05-24 | End: 2022-05-24

## 2022-05-24 RX ORDER — FENTANYL CITRATE/PF 50 MCG/ML
50 SYRINGE (ML) INJECTION
Status: DISCONTINUED | OUTPATIENT
Start: 2022-05-24 | End: 2022-05-24 | Stop reason: HOSPADM

## 2022-05-24 RX ORDER — MIDAZOLAM HYDROCHLORIDE 2 MG/2ML
INJECTION, SOLUTION INTRAMUSCULAR; INTRAVENOUS AS NEEDED
Status: DISCONTINUED | OUTPATIENT
Start: 2022-05-24 | End: 2022-05-24

## 2022-05-24 RX ORDER — GLYCOPYRROLATE 0.2 MG/ML
INJECTION INTRAMUSCULAR; INTRAVENOUS AS NEEDED
Status: DISCONTINUED | OUTPATIENT
Start: 2022-05-24 | End: 2022-05-24

## 2022-05-24 RX ORDER — PROPOFOL 10 MG/ML
INJECTION, EMULSION INTRAVENOUS AS NEEDED
Status: DISCONTINUED | OUTPATIENT
Start: 2022-05-24 | End: 2022-05-24

## 2022-05-24 RX ORDER — DEXMEDETOMIDINE HYDROCHLORIDE 100 UG/ML
INJECTION, SOLUTION INTRAVENOUS AS NEEDED
Status: DISCONTINUED | OUTPATIENT
Start: 2022-05-24 | End: 2022-05-24

## 2022-05-24 RX ORDER — ROPIVACAINE HYDROCHLORIDE 5 MG/ML
INJECTION, SOLUTION EPIDURAL; INFILTRATION; PERINEURAL
Status: COMPLETED | OUTPATIENT
Start: 2022-05-24 | End: 2022-05-24

## 2022-05-24 RX ORDER — ONDANSETRON 2 MG/ML
4 INJECTION INTRAMUSCULAR; INTRAVENOUS ONCE AS NEEDED
Status: DISCONTINUED | OUTPATIENT
Start: 2022-05-24 | End: 2022-05-24 | Stop reason: HOSPADM

## 2022-05-24 RX ORDER — SODIUM CHLORIDE, SODIUM LACTATE, POTASSIUM CHLORIDE, CALCIUM CHLORIDE 600; 310; 30; 20 MG/100ML; MG/100ML; MG/100ML; MG/100ML
125 INJECTION, SOLUTION INTRAVENOUS CONTINUOUS
Status: DISCONTINUED | OUTPATIENT
Start: 2022-05-24 | End: 2022-05-24 | Stop reason: HOSPADM

## 2022-05-24 RX ORDER — PROPOFOL 10 MG/ML
INJECTION, EMULSION INTRAVENOUS CONTINUOUS PRN
Status: DISCONTINUED | OUTPATIENT
Start: 2022-05-24 | End: 2022-05-24

## 2022-05-24 RX ORDER — CEFAZOLIN SODIUM 2 G/50ML
2000 SOLUTION INTRAVENOUS ONCE
Status: COMPLETED | OUTPATIENT
Start: 2022-05-24 | End: 2022-05-24

## 2022-05-24 RX ORDER — CHLORHEXIDINE GLUCONATE 0.12 MG/ML
15 RINSE ORAL ONCE
Status: DISCONTINUED | OUTPATIENT
Start: 2022-05-24 | End: 2022-05-24 | Stop reason: HOSPADM

## 2022-05-24 RX ADMIN — ONDANSETRON 4 MG: 2 INJECTION INTRAMUSCULAR; INTRAVENOUS at 08:49

## 2022-05-24 RX ADMIN — PROPOFOL 100 MCG/KG/MIN: 10 INJECTION, EMULSION INTRAVENOUS at 08:50

## 2022-05-24 RX ADMIN — FENTANYL CITRATE 25 MCG: 50 INJECTION INTRAMUSCULAR; INTRAVENOUS at 09:00

## 2022-05-24 RX ADMIN — EPHEDRINE SULFATE 5 MG: 50 INJECTION, SOLUTION INTRAVENOUS at 09:14

## 2022-05-24 RX ADMIN — MIDAZOLAM 2 MG: 1 INJECTION INTRAMUSCULAR; INTRAVENOUS at 08:13

## 2022-05-24 RX ADMIN — DEXMEDETOMIDINE HCL 4 MCG: 100 INJECTION INTRAVENOUS at 08:55

## 2022-05-24 RX ADMIN — Medication 25 MG: at 09:00

## 2022-05-24 RX ADMIN — CEFAZOLIN SODIUM 2000 MG: 2 SOLUTION INTRAVENOUS at 08:45

## 2022-05-24 RX ADMIN — ROPIVACAINE HYDROCHLORIDE 20 ML: 5 INJECTION, SOLUTION EPIDURAL; INFILTRATION; PERINEURAL at 08:05

## 2022-05-24 RX ADMIN — DEXMEDETOMIDINE HCL 4 MCG: 100 INJECTION INTRAVENOUS at 08:50

## 2022-05-24 RX ADMIN — FENTANYL CITRATE 25 MCG: 50 INJECTION INTRAMUSCULAR; INTRAVENOUS at 08:57

## 2022-05-24 RX ADMIN — SODIUM CHLORIDE, SODIUM LACTATE, POTASSIUM CHLORIDE, AND CALCIUM CHLORIDE: .6; .31; .03; .02 INJECTION, SOLUTION INTRAVENOUS at 08:47

## 2022-05-24 RX ADMIN — DEXMEDETOMIDINE HCL 4 MCG: 100 INJECTION INTRAVENOUS at 08:52

## 2022-05-24 RX ADMIN — FENTANYL CITRATE 50 MCG: 50 INJECTION INTRAMUSCULAR; INTRAVENOUS at 08:13

## 2022-05-24 RX ADMIN — LIDOCAINE HYDROCHLORIDE 40 MG: 10 INJECTION, SOLUTION EPIDURAL; INFILTRATION; INTRACAUDAL at 08:50

## 2022-05-24 RX ADMIN — PROPOFOL 50 MG: 10 INJECTION, EMULSION INTRAVENOUS at 08:50

## 2022-05-24 RX ADMIN — LIDOCAINE HYDROCHLORIDE 1 ML: 10 INJECTION, SOLUTION EPIDURAL; INFILTRATION; INTRACAUDAL at 08:05

## 2022-05-24 RX ADMIN — GLYCOPYRROLATE 0.1 MG: 0.4 INJECTION INTRAMUSCULAR; INTRAVENOUS at 08:49

## 2022-05-24 RX ADMIN — Medication 10 MG: at 09:08

## 2022-05-24 RX ADMIN — DEXMEDETOMIDINE HCL 4 MCG: 100 INJECTION INTRAVENOUS at 08:59

## 2022-05-24 RX ADMIN — Medication 15 MG: at 09:16

## 2022-05-24 NOTE — DISCHARGE INSTRUCTIONS
Post Operative Instructions    You have had surgery on your arm today, please read and follow the information below:  Elevate your hand above your elbow during the next 24-48 hours to help with swelling  Place your hand and arm over your head with motion at your shoulder three times a day  Do not apply any cream/ointment/oil to your incisions including antibiotics  Do not soak your hands in standing water (dishwater, tubs, Jacuzzi's, pools, etc ) until given permission (typically 2-3 weeks after injury)    Call the office if you notice any:  Increased numbness or tingling of your hand or fingers that is not relieved with elevation  Increasing pain that is not controlled with medication  Difficulty chewing, breathing, swallowing  Chest pains or shortness of breath  Fever over 101 4 degrees  Bandage: Please keep bandages clean and dry  Your therapist will remove your bandage at your first therapy appointment  Please do NOT put any topical agents on the surgical wound including neosporin, peroxide, tea tree oil, vitamin E, etc  as these can delay wound healing  Motion: Move fingers into a fist 5 times a day, DO NOT move any splinted fingers  Weight bearing status: Avoid heavy lifting (>5 pounds) with the extremity that was operated on until follow up appointment  Normal activities of daily living are OK  Ice: Ice for 10 minutes every hour as needed for swelling x 24 hours  Sling: No sling necessary  Pain medication:   Naproxen 220 mg two time a day (do not take this medication if you were told by your doctor that you cannot take anti-inflammatories or NSAIDS)  Tylenol Extended Release 650 mg every 8 hours  Norco/Hydrocodone one tab every 6 hours ONLY AS NEEDED for severe pain         Follow-up Appointment: 10-14 days with Dr Mike Jimenez    Please call occupational therapy to begin therapy next week! [address] [facility] office number is [phone], other locations can be found online      Please call the office if you have any questions or concerns regarding your post-operative care

## 2022-05-24 NOTE — ANESTHESIA PREPROCEDURE EVALUATION
Procedure:  REPAIR TENDON FINGER/HAND-THUMB (Left Thumb)    Relevant Problems   ANESTHESIA (within normal limits)      CARDIO (within normal limits)      PULMONARY   (+) Smoking      Other   (+) Class 2 obesity in adult        Physical Exam    Airway    Mallampati score: II  TM Distance: >3 FB  Neck ROM: full     Dental   No notable dental hx     Cardiovascular  No murmur,     Pulmonary      Other Findings        Anesthesia Plan  ASA Score- 2     Anesthesia Type- IV sedation with anesthesia with ASA Monitors  Additional Monitors:   Airway Plan:     Comment: GA/LMA backup  Surgeon requests brachial plexus block  Plan Factors-Exercise tolerance (METS): >4 METS  Chart reviewed  Patient summary reviewed  Patient is a current smoker  Patient did not smoke on day of surgery  Induction-     Postoperative Plan-     Informed Consent- Anesthetic plan and risks discussed with patient  I personally reviewed this patient with the CRNA  Discussed and agreed on the Anesthesia Plan with the CRNA  Cheri Bocanegra

## 2022-05-24 NOTE — ANESTHESIA PROCEDURE NOTES
Peripheral Block    Patient location during procedure: pre-op  Start time: 5/24/2022 7:58 AM  Reason for block: at surgeon's request and post-op pain management  Staffing  Performed: Anesthesiologist   Anesthesiologist: Cole Canseco MD  Preanesthetic Checklist  Completed: patient identified, IV checked, site marked, risks and benefits discussed, surgical consent, monitors and equipment checked, pre-op evaluation and timeout performed  Peripheral Block  Patient position: supine  Prep: ChloraPrep  Patient monitoring: heart rate and continuous pulse ox  Block type: supraclavicular  Laterality: left  Injection technique: single-shot  Procedures: ultrasound guided, Ultrasound guidance required for the procedure to increase accuracy and safety of medication placement and decrease risk of complications    Ultrasound permanent image savedlidocaine (PF) (XYLOCAINE-MPF) 1 % injection 0 5 mL - Infiltration   1 mL - 5/24/2022 8:05:00 AM  ropivacaine (NAROPIN) 0 5 % - Perineural   20 mL - 5/24/2022 8:05:00 AM  Needle  Needle type: Stimuplex   Needle gauge: 22 G  Needle localization: ultrasound guidance  Test dose: negative  Assessment  Injection assessment: incremental injection, local visualized surrounding nerve on ultrasound, negative aspiration for heme and no paresthesia on injection  Heart rate change: no  Slow fractionated injection: yes  Post-procedure:  site cleaned  patient tolerated the procedure well with no immediate complications  Additional Notes  Dexamethasone 5 mg added to local

## 2022-05-24 NOTE — ANESTHESIA POSTPROCEDURE EVALUATION
Post-Op Assessment Note    CV Status:  Stable  Pain Score: 0    Pain management: adequate     Mental Status:  Awake and sleepy   Hydration Status:  Euvolemic   PONV Controlled:  Controlled   Airway Patency:  Patent and adequate      Post Op Vitals Reviewed: Yes      Staff: CRNA         No complications documented      BP   106/61   Temp 97 6   Pulse 73   Resp 20   SpO2 98

## 2022-05-24 NOTE — OP NOTE
OPERATIVE REPORT  PATIENT NAME: Yumiko Morelos  :  1995  MRN: [id number]  Pt Location: [facility] MAIN OR    SURGERY DATE: 22    Surgeon(s) and Role:     * Kimmy Courtney MD - Primary     * Estephania Zayas PA-C - Assisting    Pre-Op Diagnosis:  Thumb laceration, left, subsequent encounter [S61 012D]    Post-Op Diagnosis Codes: * Thumb laceration, left, subsequent encounter [S61 012D]    Procedure(s):  REPAIR TENDON FINGER/HAND-THUMB; EPL AND EPD REPAIR (Left)    Specimen(s):  * No orders in the log *    Estimated Blood Loss:   Minimal    Anesthesia Type:   Regional with Sedation    IMPLANTS:  * No implants in log *    PERIOPERATIVE ANTIBIOTICS:    cefazolin, 2 grams    Tourniquet Time:  41 min  at 250 mmHg          Operative Indications: The patient has a history of  left thumb laceration with concern for EPL disruption, subacute that was recalcitrant to conservative management  The decision was made to bring the patient to the operating room for  left thumb exploration, the EPL repair and all other necessary procedures Risks of the procedure were explained which include, but are not limited to bleeding; infection; damage to nerves, arteries,veins, tendons; scar; pain; need for reoperation; failure to give desired result; and risks of anaesthesia  All questions were answered to satisfaction and they were willing to proceed  Operative Findings:  Complete laceration of EPL with approximately cm of interposed scar tissue  Complete laceration of EPB tendon at its insertion into the dorsal dooley expansion     Complications:   None    Procedure and Technique:  After the patient, site, and procedure were identified, the patient was brought into the operating room in a supine position  Regional and sedation anaesthesia were provided  A well padded tourniquet was applied to the extremity, set at 250 mmHg    The  left upper extremity was then prepped and drapped in a normal, sterile, orthopedic fashion  The previous laceration which was transverse in orientation was extended proximally and distally in a Brunner type fashion  Full-thickness skin flaps were elevated and under loupe magnification all superficial neurovascular structures were protected  The extensor mechanism was identified and there appeared to be pseudo tendon with significant scar tissue between the proximal and distal   Stumps of the EPL tendon dissection was undertaken using a 64 blade working from distal to proximal and proximal to distal identifying where the normal tendon edges were  Approximately 8 mm of scar tissue were sharply excised freeing up the ends  Was noted there is she had complete laceration of the EPL as well as a laceration of the EPB at its insertion into the dorsal dooley expansion  Once the tendon edges were cleaned up and all residual scar tissue removed the EPL tendon was repaired using a 4 core suture tendon repair technique using 3-0 FiberWire this was further reinforced with an additional 2 horizontal mattress sutures using the 3-0 FiberWire and again finished off with a 6 0 Prolene epitendinous suture to decrease bulk and reinforce strength  It was noted that the thumb could go into full flexion and return to a slightly hyper center position at the IP joint similar to her contralateral side  Next the EPB tendon was repaired its distal insertion into the expansion dooley was identified and was repaired using 3-0 FiberWire and again a 6 0 Prolene for epitendinous repair  At the completion of the procedure, hemostasis was obtained with cautery and direct pressure  The wounds were copiously irrigated with sterile solution  The wounds were closed with Monocryl  Sterile dressings were applied, including Xeroform, gauze, tweeners, webril, ACE and Thumb Spica Splint  Please note, all sponge, needle, and instrument counts were correct prior to closure  Loupe magnification was utilized    The patient tolerated the procedure well       I was present for the entire procedure, A qualified resident physician was not available and A physician assistant was required during the procedure for retraction tissue handling,dissection and suturing    Patient Disposition:  PACU     SIGNATURE: Cyrus Villafuerte MD  DATE: 05/24/22  TIME: 10:59 AM

## 2022-06-01 ENCOUNTER — EVALUATION (OUTPATIENT)
Dept: OCCUPATIONAL THERAPY | Age: 27
End: 2022-06-01
Payer: OTHER MISCELLANEOUS

## 2022-06-01 DIAGNOSIS — S56.429D EXTENSOR TENDON LACERATION OF FINGER WITH OPEN WOUND, SUBSEQUENT ENCOUNTER: ICD-10-CM

## 2022-06-01 DIAGNOSIS — S61.209D EXTENSOR TENDON LACERATION OF FINGER WITH OPEN WOUND, SUBSEQUENT ENCOUNTER: ICD-10-CM

## 2022-06-01 DIAGNOSIS — S61.012A THUMB LACERATION, LEFT, INITIAL ENCOUNTER: Primary | ICD-10-CM

## 2022-06-01 DIAGNOSIS — S61.012D THUMB LACERATION, LEFT, SUBSEQUENT ENCOUNTER: ICD-10-CM

## 2022-06-01 PROCEDURE — 97166 OT EVAL MOD COMPLEX 45 MIN: CPT | Performed by: OCCUPATIONAL THERAPIST

## 2022-06-01 PROCEDURE — L3808 WHFO, RIGID W/O JOINTS: HCPCS | Performed by: OCCUPATIONAL THERAPIST

## 2022-06-01 NOTE — PROGRESS NOTES
OT Evaluation     Today's date: 2022  Patient name: Radha Samuels  : 1995  MRN: [id number]  Referring provider: Akua Cavazos PA-C  Dx:   Encounter Diagnosis     ICD-10-CM    1  Thumb laceration, left, initial encounter  S61 012A                   Assessment  Assessment details: Client presented with EPL laceration and repair of the left hand  Cast was removed and wound was cleaned  Client was flitted for a wrist and hand  IP flexion blocking splint with, IP slight hyperextension,  25 degrees wrist extension,  25 degrees radial / 25 degrees palmar abduction of the thumb  EPL  Tendon repair protocol was followed and Tendon was intact after repair  Client would benefit from skilled occupational therapy to address wound care and healing, ROM, and splinting  Impairments: abnormal or restricted ROM, activity intolerance, impaired physical strength, lacks appropriate home exercise program, pain with function and weight-bearing intolerance    Goals  STG:  Compliant with HEP by subjective report within 1 week  STG: Improve wrist ROM by 25% in 2-4 weeks  LTG: Improve participation ADL's/IADL's  by subjective report in 4 weeks  LTG: Improve participation in work activities  By subjective report in 4 weeks  LTG: Improve participation of leisure activities by subjective report in 4 weeks       Plan  Patient would benefit from: custom splinting, orthotics and skilled occupational therapy  Planned modality interventions: manual electrical stimulation, TENS, thermotherapy: hydrocollator packs, thermotherapy: paraffin bath, fluidotherapy and cryotherapy  Planned therapy interventions: joint mobilization, manual therapy, orthotic fitting/training, neuromuscular re-education, therapeutic activities, therapeutic exercise, home exercise program, functional ROM exercises, fine motor coordination training and strengthening  Frequency: 2x week  Duration in visits: 10  Duration in weeks: 6  Plan of Care beginning date: 6/1/2022  Plan of Care expiration date: 7/13/2022  Treatment plan discussed with: patient        Subjective Evaluation    History of Present Illness  Mechanism of injury: Evaluate and Treat - begin one week postop, custom thumb extension splint s/p epl repair, motion per protocol  She was holding a nail bottle which broke  EPL Tendon laceration             Objective     Active Range of Motion     Left Thumb   Extension     MP: 0 degrees    DIP: 5 degrees             Precautions: Extensor tendon Repair      3-5 days postop -elastic stockinette is applied hand an forearm along with 1" Coban or finger sock with thumb  Custom fabricated wrist and thumb static orthosis is fitted with the writ in 20 degrees of extension, thumb midway radial palmar abduction  MP joint extension IP slight hyper extension       10-14 days postop-  Once wound healed scar massage, 3-4 times a day, Manual desensitization exercises iniatied 3-4 times a day       3 weeks postop- Gentle AROM to wrist and thumb relax in extension 3-4 times a day, Moist heat for 5 min prior     4 weeks postop- AROM mid range active flexion/ extension in the initial 3-4 days and progress full arc motion  NMES to facilitate EPL gliding       5 weeks postop- AROM exercises , add composite active wrist and thumb flexion to reolve EETof EPL, Self PROM wrist flexion exercises  Wrist and thumb static orthosis is reduced to a thumb extension orthosis with wrist free       6 weeks postop- Add self passive ROM with the thumb, after intial 2-3 days isolate MP, IP, Thumb orthosis left off for 3 1 hour sessions       7 week postop- Combine passive flexion of the wrist and thumb is initiated  Slow motion long stretches ( 15-30 sec)         Manuals             Debridement              Splinting                                        Neuro Re-Ed                                                                                                        Ther Ex Ther Activity                                       Gait Training                                       Modalities

## 2022-06-08 ENCOUNTER — OFFICE VISIT (OUTPATIENT)
Dept: OBGYN CLINIC | Facility: CLINIC | Age: 27
End: 2022-06-08

## 2022-06-08 ENCOUNTER — TELEPHONE (OUTPATIENT)
Dept: OBGYN CLINIC | Facility: CLINIC | Age: 27
End: 2022-06-08

## 2022-06-08 ENCOUNTER — OFFICE VISIT (OUTPATIENT)
Dept: OCCUPATIONAL THERAPY | Age: 27
End: 2022-06-08
Payer: OTHER MISCELLANEOUS

## 2022-06-08 VITALS
RESPIRATION RATE: 16 BRPM | SYSTOLIC BLOOD PRESSURE: 121 MMHG | HEART RATE: 72 BPM | HEIGHT: 65 IN | BODY MASS INDEX: 35.22 KG/M2 | WEIGHT: 211.4 LBS | DIASTOLIC BLOOD PRESSURE: 76 MMHG

## 2022-06-08 DIAGNOSIS — S61.012D THUMB LACERATION, LEFT, SUBSEQUENT ENCOUNTER: ICD-10-CM

## 2022-06-08 DIAGNOSIS — S61.209D EXTENSOR TENDON LACERATION OF FINGER WITH OPEN WOUND, SUBSEQUENT ENCOUNTER: ICD-10-CM

## 2022-06-08 DIAGNOSIS — S56.429D EXTENSOR TENDON LACERATION OF FINGER WITH OPEN WOUND, SUBSEQUENT ENCOUNTER: ICD-10-CM

## 2022-06-08 DIAGNOSIS — S61.012A THUMB LACERATION, LEFT, INITIAL ENCOUNTER: Primary | ICD-10-CM

## 2022-06-08 DIAGNOSIS — Z98.890 S/P TENDON REPAIR: Primary | ICD-10-CM

## 2022-06-08 PROCEDURE — 97140 MANUAL THERAPY 1/> REGIONS: CPT

## 2022-06-08 PROCEDURE — 99024 POSTOP FOLLOW-UP VISIT: CPT | Performed by: SURGERY

## 2022-06-08 NOTE — TELEPHONE ENCOUNTER
Please call pt to schedule a 4 wk post op visit with Dr Willow Rutledge  Pt can only come in on Wednesdays    PO 4 wks left thumb SX 5/24    # [phone]

## 2022-06-08 NOTE — PROGRESS NOTES
Assessment/Plan:  Patient ID: Laura Crawford 24 y o  female   Surgery: Repair Tendon Finger/hand-thumb; Epl And Epd Repair - Left  Date of Surgery: 5/24/2022    15 days s/p above surgery  Suture ends were cut  She was advised to perform scar massage  Start OT for early active motion for EPL repair  If she would like to attend OT closer to home, advised to look on [email or web address] to find a certified hand therapist  Pooja Ramirez no composite wrist extension and thumb flexion x4 weeks  Continue the use of the wrist and hand  IP flexion blocking splint with, IP slight hyperextension,  25 degrees wrist extension,  25 degrees radial / 25 degrees palmar abduction of the thumb for 4 more weeks  No use of the left hand for work purposes, note was provided, she will be re-evaluated in 4 weeks time  Follow Up:  4  week(s)    To Do Next Visit:  Re-evaluation       CHIEF COMPLAINT:  Chief Complaint   Patient presents with    Left Thumb - Post-op         SUBJECTIVE:  Laura Crawford is a 24y o  year old female who presents for follow up after Repair Tendon Finger/hand-thumb; Epl And Epd Repair - Left  She states 3 days ago when using the bathroom she went to pull up her pants when she jammed her thumb  She feels she pulled or popped a stitch  PHYSICAL EXAMINATION:  General: well developed and well nourished, alert, oriented times 3 and appears comfortable  Psychiatric: Normal    MUSCULOSKELETAL EXAMINATION:  Incision: clean, dry and suture(s) intact   Surgery Site: normal, no evidence of infection   Range of Motion: As expected  Neurovascular status: Neuro intact, good cap refill  Activity Restrictions: Cast/splint restrictions x4 weeks and no composite wrist extension and thumb flexion x4 weeks    Done today: Suture ends cut      STUDIES REVIEWED:  No Studies to review      PROCEDURES PERFORMED:  Procedures  No Procedures performed today    Scribe Attestation    I,:  Emre Parker am acting as a scribe while in the presence of the attending physician :       I,:  Parag Fernandez MD personally performed the services described in this documentation    as scribed in my presence :

## 2022-06-08 NOTE — PROGRESS NOTES
Daily Note     Today's date: 2022  Patient name: Karis Garay  : 1995  MRN: [id number]  Referring provider: Arianne Domingo PA-C  Dx:   Encounter Diagnosis     ICD-10-CM    1  Thumb laceration, left, initial encounter  S61 012A    2  Extensor tendon laceration of finger with open wound, subsequent encounter  S56 429D     S61 209D    3  Thumb laceration, left, subsequent encounter  S61 012D                   Subjective: I think it looks ugl      Objective: See treatment diary below      Assessment: Tolerated treatment well  Patient would benefit from continued OT  Due to the driving distance of [place] to our clinic, it is recommended she find a CHT closer to her location  She is 83 days from initial injury, 15 days post-op from extensor tendon repair  She was given scar mobilization exercises to be started today and wrist and thumb gentle AROM to be started at the three week veronika ()  It was stressed to [facility] that she needs to see a therapist who specializes in hands secondary to her fragile surgery and her need to have full use of her thumb for daily function and to RTW  She understood and plans to find a therapist closer to home  She was instructed to call or email with any questions or concerns at any time which she also verbalized understanding to  Her injury and future potenials were discussed with [facility] at her request  It was explained without her EPL she would not be functionally able to extend her thumb, She verbalized understanding  At this time, she will FU with her hand surgeon and a CHT closer to home  Plan: D/C secondary to distance     Precautions: Extensor tendon Repair      3-5 days postop -elastic stockinette is applied hand an forearm along with 1" Coban or finger sock with thumb  Custom fabricated wrist and thumb static orthosis is fitted with the writ in 20 degrees of extension, thumb midway radial palmar abduction  MP joint extension IP slight hyper extension     10-14 days postop-  Once wound healed scar massage, 3-4 times a day, Manual desensitization exercises iniatied 3-4 times a day       3 weeks postop- Gentle AROM to wrist and thumb relax in extension 3-4 times a day, Moist heat for 5 min prior     4 weeks postop- AROM mid range active flexion/ extension in the initial 3-4 days and progress full arc motion  NMES to facilitate EPL gliding       5 weeks postop- AROM exercises , add composite active wrist and thumb flexion to reolve EETof EPL, Self PROM wrist flexion exercises  Wrist and thumb static orthosis is reduced to a thumb extension orthosis with wrist free       6 weeks postop- Add self passive ROM with the thumb, after intial 2-3 days isolate MP, IP, Thumb orthosis left off for 3 1 hour sessions       7 week postop- Combine passive flexion of the wrist and thumb is initiated  Slow motion long stretches ( 15-30 sec)         Manuals             Debridement              Splinting                                        Neuro Re-Ed                                                                                                        Ther Ex                                                                                                                     Ther Activity                                       Gait Training                                       Modalities

## 2022-06-08 NOTE — LETTER
June 8, 2022     Patient: Ramona Thomas  YOB: 1995  Date of Visit: 6/8/2022      To Whom it May Concern:    Ramona Thomas is under my professional care  Ailyn Cade was seen in my office on 6/8/2022  No use of the left hand for work purposes  She will be re-evaluated in 4 weeks time  If you have any questions or concerns, please don't hesitate to call           Sincerely,          Pat Rosa MD

## 2022-07-06 ENCOUNTER — OFFICE VISIT (OUTPATIENT)
Dept: OBGYN CLINIC | Facility: CLINIC | Age: 27
End: 2022-07-06

## 2022-07-06 VITALS
HEIGHT: 65 IN | SYSTOLIC BLOOD PRESSURE: 119 MMHG | WEIGHT: 212 LBS | BODY MASS INDEX: 35.32 KG/M2 | RESPIRATION RATE: 16 BRPM | HEART RATE: 71 BPM | DIASTOLIC BLOOD PRESSURE: 76 MMHG

## 2022-07-06 DIAGNOSIS — S61.012D THUMB LACERATION, LEFT, SUBSEQUENT ENCOUNTER: ICD-10-CM

## 2022-07-06 DIAGNOSIS — Z98.890 S/P TENDON REPAIR: Primary | ICD-10-CM

## 2022-07-06 PROCEDURE — 99024 POSTOP FOLLOW-UP VISIT: CPT | Performed by: SURGERY

## 2022-07-06 NOTE — PROGRESS NOTES
Assessment/Plan:  Patient ID: Cinthya Duenas 24 y o  female   Surgery: Repair Tendon Finger/hand-thumb; Epl And Epd Repair - Left  Date of Surgery: 5/24/2022    Continue OT: update order was given out today, scar modalities, discontinue brace, work on motion thumb to small finger and improve composite of the thumb  No sport activities at this time   Discontinue wearing brace   Continue with scar massage   Avoid heavy lifting but may do daily activities  In 6 weeks will start strengthening       Follow Up:  6  week(s)    To Do Next Visit:    re evaluate       CHIEF COMPLAINT:  Chief Complaint   Patient presents with    Left Thumb - Post-op, Follow-up         SUBJECTIVE:  Cinthya Duenas is a 24y o  year old female who presents for follow up after Repair Tendon Finger/hand-thumb; Epl And Epd Repair - Left  She states she is having hypersensitivity over the incision site  She has been doing scar massage daily  She is wearing a brace  She is currently in OT at [facility] in [place]  She still feels her motion is limited in her right thumb and had trouble turning a key         PHYSICAL EXAMINATION:  General: well developed and well nourished, alert, oriented times 3 and appears comfortable  Psychiatric: Normal    MUSCULOSKELETAL EXAMINATION:  Incision: Clean, dry, intact  Surgery Site: normal, no evidence of infection   Range of Motion: As expected  Neurovascular status: Neuro intact, good cap refill  Activity Restrictions: No restrictions         STUDIES REVIEWED:  No Studies to review      PROCEDURES PERFORMED:  Procedures  No Procedures performed today    Scribe Attestation    I,:  Santosh Barrett am acting as a scribe while in the presence of the attending physician :       I,:  Alia Patel MD personally performed the services described in this documentation    as scribed in my presence :

## 2022-07-07 ENCOUNTER — TELEPHONE (OUTPATIENT)
Dept: OBGYN CLINIC | Facility: HOSPITAL | Age: 27
End: 2022-07-07

## 2022-07-26 ENCOUNTER — TELEPHONE (OUTPATIENT)
Dept: OBGYN CLINIC | Facility: HOSPITAL | Age: 27
End: 2022-07-26

## 2022-07-26 DIAGNOSIS — S56.429D EXTENSOR TENDON LACERATION OF FINGER WITH OPEN WOUND, SUBSEQUENT ENCOUNTER: Primary | ICD-10-CM

## 2022-07-26 DIAGNOSIS — S61.209D EXTENSOR TENDON LACERATION OF FINGER WITH OPEN WOUND, SUBSEQUENT ENCOUNTER: Primary | ICD-10-CM

## 2022-07-26 NOTE — TELEPHONE ENCOUNTER
Patient's OT called to advise she was last seen by you 7/6  She is now at OT and needs an updated script and protocol please faxed to [phone]  She would like to know if strengthening can begin and if she is able to perform cupping over the scars?  Mahin Rosa can be reached [phone]

## 2022-08-17 ENCOUNTER — OFFICE VISIT (OUTPATIENT)
Dept: OBGYN CLINIC | Facility: CLINIC | Age: 27
End: 2022-08-17

## 2022-08-17 VITALS
HEIGHT: 65 IN | SYSTOLIC BLOOD PRESSURE: 135 MMHG | DIASTOLIC BLOOD PRESSURE: 80 MMHG | BODY MASS INDEX: 35.32 KG/M2 | HEART RATE: 80 BPM | WEIGHT: 212 LBS

## 2022-08-17 DIAGNOSIS — Z98.890 S/P TENDON REPAIR: Primary | ICD-10-CM

## 2022-08-17 PROCEDURE — 99024 POSTOP FOLLOW-UP VISIT: CPT | Performed by: SURGERY

## 2022-08-17 NOTE — PROGRESS NOTES
Assessment/Plan:  Patient ID: Cinthya Duenas 24 y o  female   Surgery: Repair Tendon Finger/hand-thumb; Epl And Epd Repair - Left  Date of Surgery: 5/24/2022    12 weeks s/p above surgery  Overall Ahsan Duenas is doing well  Thumb ROM has significantly improved since her last visit  She has near full thumb extension and opposition is intact to the base of the small finger  Advised to continue formal OT for 6 more weeks for ROM and strengthening exercises  A note was provided to her insurance company, per her request, Ahsan Duenas continures to make improvement in motion, rang of motion is approaching that of contralateral side, she still has significant weakness and would benefit from 6 more weeks of formal occupational therapy for strengthening exercises  Updated OT script was provided  Follow up in 8 weeks time for final re-evaluation  Follow Up:  8  week(s)    To Do Next Visit:  ROM check       CHIEF COMPLAINT:  Chief Complaint   Patient presents with    Left Thumb - Post-op         SUBJECTIVE:  Cinthya Duenas is a 24y o  year old female who presents for follow up after Repair Tendon Finger/hand-thumb; Epl And Epd Repair - Left  Overall she is doing well  She notes ROM has improved  She still feel her thumb is weak  She has some sensitivity to her scar  She is performing scar massage and using silicone pads  She notes increased pain with rainy or cold weather         PHYSICAL EXAMINATION:  General: well developed and well nourished, alert, oriented times 3 and appears comfortable  Psychiatric: Normal    MUSCULOSKELETAL EXAMINATION:  Incision: healed  Surgery Site: normal, no evidence of infection   Range of Motion: As expected, near full extension, opposition intact to the base of the small finger   Neurovascular status: Neuro intact, good cap refill  Activity Restrictions: No restrictions      STUDIES REVIEWED:  No Studies to review      PROCEDURES PERFORMED:  Procedures  No Procedures performed today    Scribe Attestation    I,:  Aneudy Mcleod am acting as a scribe while in the presence of the attending physician :       I,:  Jemraine Mason MD personally performed the services described in this documentation    as scribed in my presence :

## 2022-08-17 NOTE — LETTER
To whom is may concern,     Araseli Pacheco continues to make significant improvement in left thumb range of motion with formal occupational therapy  Her range of motion is approaching that of the contralateral side  She does have significant weakness and would benefit from 6 more weeks of formal occupational therapy for strengthening exercises       If you have any questions please do not hesitate to call   [phone]      Dr Zachary HA

## 2022-08-18 ENCOUNTER — TELEPHONE (OUTPATIENT)
Dept: OBGYN CLINIC | Facility: MEDICAL CENTER | Age: 27
End: 2022-08-18

## 2022-08-30 ENCOUNTER — TELEPHONE (OUTPATIENT)
Dept: OBGYN CLINIC | Facility: HOSPITAL | Age: 27
End: 2022-08-30

## 2022-08-30 NOTE — TELEPHONE ENCOUNTER
DR José Granados left hand injury   [phone]    Patient is seen by Dr Paige Reynolds for her left hand injury  Patient would like to speak with Dr Paige Reynolds  Would not give any further info

## 2022-09-01 NOTE — TELEPHONE ENCOUNTER
We did operate near a small cutaneous sensory nerve   Should continue to improve, scar massage will help

## 2022-09-01 NOTE — TELEPHONE ENCOUNTER
Patient was called and she said that Dr Logan Rojo did return the call  She wanted let Dr Logan Rojo know that in the night sometimes she has tingling to the surgical area and she wanted to know if this is normal   Advised numbness and tingling can mean swelling  Ice and elevation 20 on 20 off  Patient verbalized understanding

## 2022-09-28 ENCOUNTER — TELEPHONE (OUTPATIENT)
Dept: OBGYN CLINIC | Facility: HOSPITAL | Age: 27
End: 2022-09-28

## 2022-09-28 NOTE — TELEPHONE ENCOUNTER
If patient has reach [address] then no further ot, but I can not determine that without seeing her unless her therapist feels she needs to work on motion or strength

## 2022-09-28 NOTE — TELEPHONE ENCOUNTER
Spoke to therapist  Therapist stated her motion is good and strength is improving  She stated she will reevaluate her on Friday and send over the "numbers" and see if you think she needs more therapy until her next follow up  Because it is WC she doesn't want to go beyond the timeframe that is listed on the script and have the patient get a bill for this  Please advise

## 2022-09-28 NOTE — TELEPHONE ENCOUNTER
That sounds reasonable  Thank you  I anticipate her flexion and extension will be somewhat less than other side, but as long as approaching normal should be okay

## 2022-10-11 PROBLEM — S61.012D THUMB LACERATION, LEFT, SUBSEQUENT ENCOUNTER: Status: RESOLVED | Noted: 2022-05-24 | Resolved: 2022-10-11

## 2022-10-11 PROBLEM — S61.209A EXTENSOR TENDON LACERATION, FINGER, OPEN WOUND: Status: RESOLVED | Noted: 2022-05-24 | Resolved: 2022-10-11

## 2022-10-11 PROBLEM — S56.429A EXTENSOR TENDON LACERATION, FINGER, OPEN WOUND: Status: RESOLVED | Noted: 2022-05-24 | Resolved: 2022-10-11

## (undated) DEVICE — INTENDED FOR TISSUE SEPARATION, AND OTHER PROCEDURES THAT REQUIRE A SHARP SURGICAL BLADE TO PUNCTURE OR CUT.: Brand: BARD-PARKER ® CARBON RIB-BACK BLADES

## (undated) DEVICE — IMPERVIOUS STOCKINETTE: Brand: DEROYAL

## (undated) DEVICE — MINI BLADE ROUND TIP SHARP ON ONE SIDE

## (undated) DEVICE — GAUZE SPONGES,16 PLY: Brand: CURITY

## (undated) DEVICE — ACE WRAP 4 IN STERILE

## (undated) DEVICE — CUFF TOURNIQUET 18 X 4 IN QUICK CONNECT DISP 1 BLADDER

## (undated) DEVICE — STERILE BETHLEHEM PLASTIC HAND: Brand: CARDINAL HEALTH

## (undated) DEVICE — SUT PROLENE 6-0 P-3 18 IN 8695G

## (undated) DEVICE — NEEDLE 25G X 1 1/2

## (undated) DEVICE — GLOVE SRG BIOGEL 7

## (undated) DEVICE — GLOVE INDICATOR PI UNDERGLOVE SZ 7 BLUE

## (undated) DEVICE — TUBING SUCTION 5MM X 12 FT

## (undated) DEVICE — OCCLUSIVE GAUZE STRIP,3% BISMUTH TRIBROMOPHENATE IN PETROLATUM BLEND: Brand: XEROFORM

## (undated) DEVICE — SPONGE PVP SCRUB WING STERILE

## (undated) DEVICE — GLOVE SRG BIOGEL 6.5

## (undated) DEVICE — SPLINT 3 X 15 IN XFAST SET PLASTER

## (undated) DEVICE — PADDING CAST 4 IN  COTTON STRL

## (undated) DEVICE — DISPOSABLE EQUIPMENT COVER: Brand: SMALL TOWEL DRAPE

## (undated) DEVICE — GLOVE INDICATOR PI UNDERGLOVE SZ 6.5 BLUE